# Patient Record
Sex: FEMALE | Race: WHITE | NOT HISPANIC OR LATINO | URBAN - METROPOLITAN AREA
[De-identification: names, ages, dates, MRNs, and addresses within clinical notes are randomized per-mention and may not be internally consistent; named-entity substitution may affect disease eponyms.]

---

## 2021-09-24 ENCOUNTER — EMERGENCY (EMERGENCY)
Facility: HOSPITAL | Age: 26
LOS: 1 days | Discharge: HOME | End: 2021-09-24
Attending: EMERGENCY MEDICINE | Admitting: EMERGENCY MEDICINE
Payer: COMMERCIAL

## 2021-09-24 VITALS
DIASTOLIC BLOOD PRESSURE: 66 MMHG | OXYGEN SATURATION: 100 % | TEMPERATURE: 98 F | HEART RATE: 74 BPM | RESPIRATION RATE: 18 BRPM | SYSTOLIC BLOOD PRESSURE: 101 MMHG

## 2021-09-24 VITALS
SYSTOLIC BLOOD PRESSURE: 108 MMHG | OXYGEN SATURATION: 98 % | RESPIRATION RATE: 18 BRPM | DIASTOLIC BLOOD PRESSURE: 73 MMHG | TEMPERATURE: 98 F | HEART RATE: 71 BPM

## 2021-09-24 DIAGNOSIS — F90.9 ATTENTION-DEFICIT HYPERACTIVITY DISORDER, UNSPECIFIED TYPE: ICD-10-CM
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
## 2021-09-24 DIAGNOSIS — F32.9 MAJOR DEPRESSIVE DISORDER, SINGLE EPISODE, UNSPECIFIED: ICD-10-CM

## 2021-09-24 DIAGNOSIS — S01.01XA LACERATION WITHOUT FOREIGN BODY OF SCALP, INITIAL ENCOUNTER: ICD-10-CM
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
## 2021-09-24 DIAGNOSIS — R41.82 ALTERED MENTAL STATUS, UNSPECIFIED: ICD-10-CM

## 2021-09-24 DIAGNOSIS — Z72.89 OTHER PROBLEMS RELATED TO LIFESTYLE: ICD-10-CM

## 2021-09-24 PROCEDURE — 12001 RPR S/N/AX/GEN/TRNK 2.5CM/<: CPT

## 2021-09-24 PROCEDURE — 99284 EMERGENCY DEPT VISIT MOD MDM: CPT | Mod: 25

## 2021-09-24 PROCEDURE — 70450 CT HEAD/BRAIN W/O DYE: CPT | Mod: 26

## 2021-09-24 NOTE — ED ADULT NURSE NOTE - NSIMPLEMENTINTERV_GEN_ALL_ED
Implemented All Fall Risk Interventions:  [place] to call system. Call bell, personal items and telephone within reach. Instruct patient to call for assistance. Room bathroom lighting operational. Non-slip footwear when patient is off stretcher. Physically safe environment: no spills, clutter or unnecessary equipment. Stretcher in lowest position, wheels locked, appropriate side rails in place. Provide visual cue, wrist band, yellow gown, etc. Monitor gait and stability. Monitor for mental status changes and reorient to person, place, and time. Review medications for side effects contributing to fall risk. Reinforce activity limits and safety measures with patient and family.

## 2021-09-24 NOTE — ED PROVIDER NOTE - PATIENT PORTAL LINK FT
You can access the FollowMyHealth Patient Portal offered by  by registering at the following website: [email or web address]. By joining [email or web address]’s FollowMyHealth portal, you will also be able to view your health information using other applications (apps) compatible with our system.

## 2021-09-24 NOTE — ED ADULT NURSE REASSESSMENT NOTE - NS ED NURSE REASSESS COMMENT FT1
Received pt from pervious RN. Pt awake and alert, aox4, speech clear, neuro intact. Pt denies dizziness, HA, N/V. Dressing to laceration clean/dry/intact.

## 2021-09-24 NOTE — ED PROVIDER NOTE - OBJECTIVE STATEMENT
27 yo F, pmhx of depression and adhd, presents with L posterior head laceration. per patient she drank this evening, and tripped and fell in her bathroom, hitting head against bathtub. denies loc, denies neck pain or injury. ambulatory after fall with partner. no changes in ms, denies N/V or cp, sob, palpitations. denies bleeding do or ac.

## 2021-09-24 NOTE — ED ADULT TRIAGE NOTE - CHIEF COMPLAINT QUOTE
pt presents altered, admits to etoh. had slip and fall in bathroom, laceration to the back of the head, denies any loc.

## 2023-12-11 NOTE — ED ADULT TRIAGE NOTE - HEART RATE (BEATS/MIN)
Silva Crouch is a 61 y.o. female evaluated via telephone on 12/11/2023. Consent:  She and/or health care decision maker is aware that that she may receive a bill for this telephone service, depending on her insurance coverage, and has provided verbal consent to proceed: Yes      Documentation:      The patient arrived at clinic with viral symptoms which included: Subjective fever, chills, cough, fatigue, body aches, headache, sore throat, loss of taste/smell, congestion, nausea, sneezing. X 2 days. The patient   was not  vaccinated. The patient was tested for COVID 19 and Influenza A and B with the ACCULA PCR test in their vehicle. COVID screening questions scanned into chart. Results for orders placed or performed in visit on 12/11/23   AMB POC COVID-19 COV   Result Value Ref Range    SARS-COV-2 RNA, POC Negative     Lot number swab      EXP date swab      Lot number solution      EXP date solution      LOT NUMBER POC      EXPIRATION DATE     AMB POC INFLUENZA A  AND B REAL-TIME RT-PCR   Result Value Ref Range    Valid Internal Control, POC PASS     Influenza A Antigen, POC Positive (A)     Influenza B Antigen, POC Negative      1. Influenza A  2. Symptoms of upper respiratory infection (URI)  -     AMB POC COVID-19 COV  -     AMB POC INFLUENZA A  AND B REAL-TIME RT-PCR      I communicated with the patient and/or health care decision maker about     +   test results    Details of this discussion including any medical advice provided:    I have spent 5-10 minutes obtaining and reviewing screening questions and discussing test results with the patient regarding the diagnosis and importance of compliance with the treatment plan as well as documenting on the day of the visit. I affirm this is a Patient Initiated Episode with an Patient who has not had a related appointment within my department in the past 7 days or scheduled within the next 24 hours.     Total Time:minutes: 11-20
71